# Patient Record
Sex: MALE
[De-identification: names, ages, dates, MRNs, and addresses within clinical notes are randomized per-mention and may not be internally consistent; named-entity substitution may affect disease eponyms.]

---

## 2018-05-14 ENCOUNTER — HOSPITAL ENCOUNTER (EMERGENCY)
Dept: HOSPITAL 25 - UCEAST | Age: 33
Discharge: HOME | End: 2018-05-14
Payer: COMMERCIAL
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
## 2018-05-14 DIAGNOSIS — S70.361A: Primary | ICD-10-CM
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
## 2018-05-14 PROCEDURE — 99201: CPT

## 2018-05-14 PROCEDURE — G0463 HOSPITAL OUTPT CLINIC VISIT: HCPCS

## 2018-05-14 NOTE — UC
Skin Complaint HPI





- HPI Summary


HPI Summary: 





31 yo male presents with tick bite to right thigh first noticed this morning. 

He is confident that the tick was on him less than 24 hours. Here for removal 

as it is in a touch spot to reach. Denies fever/chills.





- History of Current Complaint


Time Seen by Provider: 05/14/18 12:25


Stated Complaint: TICK


Hx Obtained From: Patient


Onset/Duration: Sudden Onset





- Allergy/Home Medications


Allergies/Adverse Reactions: 


 Allergies











Allergy/AdvReac Type Severity Reaction Status Date / Time


 


No Known Allergies Allergy   Verified 05/14/18 12:35











Home Medications: 


 Home Medications





NK [No Home Medications Reported]  05/14/18 [History Confirmed 05/14/18]











Review of Systems


Constitutional: Negative


Skin: Other - Tick bite


Respiratory: Negative


Cardiovascular: Negative


Neurovascular: Negative


Musculoskeletal: Negative


Neurological: Negative


Psychological: Negative


All Other Systems Reviewed And Are Negative: Yes





PMH/Surg Hx/FS Hx/Imm Hx





- Additional Past Medical History


Additional PMH: 





None


Previously Healthy: Yes





- Surgical History


Surgical History: Yes


Surgery Procedure, Year, and Place: neck fussion c2-c3 in high school, cleft 

palet, hernia repair





- Family History


Known Family History: Positive: Unknown





- Social History


Occupation: Employed Full-time


Lives: Alone


Alcohol Use: None


Substance Use Type: None


Smoking Status (MU): Never Smoked Tobacco





Physical Exam





- Summary


Physical Exam Summary: 





GENERAL: NAD. WDWN. No pain distress.


SKIN: Right lateral thigh: 3mm area of mild erythema. Tick embedded within 

skin. No streaking, bleeding, or drainage.


NECK: Supple. Nontender. No lymphadenopathy. 


CHEST:  No accessory muscle use. Breathing comfortably and in no distress.


CV:  RRR. Without m/r/g.


NEURO: Alert. CN II-XII grossly intact.


PSYCH: Age appropriate behavior.





Triage Information Reviewed: Yes





Course/Dx





- Course


Course Of Treatment: Tick bite to right thigh. Tick removed with tick tweezers. 

Pt is confident tick was on him less than 24 hours. No treatment needed.





- Diagnoses


Provider Diagnoses: Tick bite





Discharge





- Sign-Out/Discharge


Documenting (check all that apply): Discharge/Admit/Transfer





- Discharge Plan


Condition: Stable


Disposition: HOME


Patient Education Materials:  Lyme Disease (ED), Tick Bite (ED)


Referrals: 


No Primary Care Phys,NOPCP [Primary Care Provider] - 


Additional Instructions: 


If you develop a fever, shortness of breath, chest pain, new or worsening 

symptoms - please call your PCP or go to the ED.


 





Your blood pressure was high at todays visit. Please see your primary provider 

within 4 weeks for recheck and re-evaluation.





TICK BITE:


     You have been bitten by a tick.  Once the tick is removed, these "bites" 

usually cause no problems.  Tick fever, tick paralysis, [place] Spotted 

fever, and Lyme disease are uncommon -- but you should mention this tick bite 

to your doctor if you develop unusual symptoms in the next several weeks.


     If you develop any of the following, please see your physician promptly:  


(1) Fever, chills, or generalized malaise associated with a headache.  


(2) A red round area at the site of the bite (or elsewhere)  


(3) Joint pain, joint swelling or generalized weakness.  


(4) Redness, swelling, or drainage at the site of the bite. 








- Billing Disposition and Condition


Condition: STABLE


Disposition: HOME